# Patient Record
Sex: FEMALE | Race: WHITE
[De-identification: names, ages, dates, MRNs, and addresses within clinical notes are randomized per-mention and may not be internally consistent; named-entity substitution may affect disease eponyms.]

---

## 2019-09-30 ENCOUNTER — HOSPITAL ENCOUNTER (OUTPATIENT)
Dept: HOSPITAL 53 - M LAB | Age: 39
End: 2019-09-30
Payer: COMMERCIAL

## 2019-09-30 DIAGNOSIS — Z32.01: Primary | ICD-10-CM

## 2019-09-30 LAB
B-HCG SERPL-ACNC: 989 MIU/ML
ESTRADIOL SERPL-MCNC: 365.7 PG/ML
PROGEST SERPL-MCNC: 53.19 NG/ML
TSH SERPL DL<=0.005 MIU/L-ACNC: 1.11 UIU/ML (ref 0.36–3.74)

## 2020-04-09 ENCOUNTER — HOSPITAL ENCOUNTER (OUTPATIENT)
Dept: HOSPITAL 53 - M RAD | Age: 40
End: 2020-04-09
Attending: OBSTETRICS & GYNECOLOGY
Payer: COMMERCIAL

## 2020-04-09 DIAGNOSIS — O30.043: Primary | ICD-10-CM

## 2020-04-09 DIAGNOSIS — Z3A.31: ICD-10-CM

## 2020-04-09 NOTE — REP
OB ULTRASOUND, TWIN GESTATION:

 

Real-time sonographic evaluation of the gravid uterus is performed.

 

There is a living diamniotic dichorionic twin gestation. Estimated gestational

age is 32 weeks 0 days, EDC 06/04/2020. Placentas are on the right laterally as

well as posteriorly and are grade 1 with no previa or abruption. Cervix is not

well visualized due to low fetal head position for fetus A. There is concordant

growth of the twins.

 

FETUS A:

 

BPD 84 mm = 33 weeks 5 days, 75th percentile

 

 mm = 32 weeks 1 day, 53rd percentile

 

 mm = 30 weeks 5 days, 30th percentile

 

FL 62 mm = 31 weeks 6 days, 49th percentile

 

HC/AC ratio 1.10. Estimated fetal weight 1767 grams, 32nd percentile. Fetal heart

rate 128 beats per minute. Amniotic fluid within normal limits, deepest pocket of

fluid is 4.2 cm. S/D ratio 2.55 and RI 0.61. Visualized fetal anatomy today

includes upper lip, four chamber heart, ventricular outflow tracts, stomach,

kidneys, bladder and spine which are all grossly unremarkable. Fetal position

vertex on the maternal right side.

 

FETUS B:

 

BPD 83 mm = 33 weeks 2 days, 68th percentile

 

 mm = 32 weeks 2 days, 54th percentile

 

 mm = 32 weeks 0 days, 50th percentile

 

FL 60 mm = 31 weeks 3 days, 41st percentile

 

HC/AC ratio 1.05. Estimated fetal weight 1868 grams, 42nd percentile. Fetal heart

rate 127 beats per minute. Amniotic fluid within normal limits, deepest pocket of

fluid 3.9 cm. S/D ratio in the umbilical artery 2.47 and RI 0.59. Visualized

fetal anatomy today includes upper lip, four chamber heart, ventricular outflow

tracts, stomach, kidneys and bladder which are grossly unremarkable. Fetal

position is vertex on the maternal left side.

 

 

Electronically Signed by

Jm Patel MD 04/10/2020 09:30 A

## 2020-04-28 ENCOUNTER — HOSPITAL ENCOUNTER (OUTPATIENT)
Dept: HOSPITAL 53 - M RAD | Age: 40
End: 2020-04-28
Attending: OBSTETRICS & GYNECOLOGY
Payer: COMMERCIAL

## 2020-04-28 DIAGNOSIS — O30.043: Primary | ICD-10-CM

## 2020-04-28 DIAGNOSIS — Z3A.34: ICD-10-CM

## 2020-04-28 NOTE — REP
Clinical:  Twin gestation.

 

Comparison: 04/09/2020 .

 

Findings:

Examination demonstrates advanced diamniotic dichorionic twin gestation. Cervix

appears closed.  Concordant growth is noted.

 

Gestational age by LMP at 34 weeks 5 days with estimated date of delivery

06/04/2020 .

 

 

TWIN A:

Twin A identified in cephalic presentation along the maternal right side.

Placenta is noted right lateral and grade I I without evidence for placenta

previa or abruption.  Fetal motion is appreciated.  Amniotic fluid volume is

normal and the deepest pocket measures 4.7 cm.

 

FHR equals 128 beats per minute.

Gestational age by current biometrical measurements: 34 weeks 6 days .

Umbilical cord SD ratio (fetal insertion):  2.31

 

Estimated fetal weight 2322 grams ( 35th percentile).

 

Limited anatomical assessment without obvious abnormality.

 

-------

 

TWIN B:

Twin B identified in cephalic presentation along the maternal left side.

Placenta is noted right lateral and grade I I without evidence for placenta

previa or abruption.  Fetal motion is appreciated.  Amniotic fluid volume is

normal and the deepest pocket measures 2.7 cm.

 

FHR equals 131 beats per minute.

Gestational age by current biometrical measurements: 34 weeks 1 day .

Umbilical cord SD ratio (fetal insertion):  2.65

 

Estimated fetal weight 2478 grams ( 46 percentile).

 

Limited anatomical assessment without obvious abnormality.

 

 

 

Impression:

Advanced diamniotic dichorionic twin gestation demonstrating appropriate

concordant growth.  No obvious gross abnormalities are identified.

 

 

Electronically Signed by

Colin Ordoñez MD 04/28/2020 05:49 P

## 2020-05-05 ENCOUNTER — HOSPITAL ENCOUNTER (OUTPATIENT)
Dept: HOSPITAL 53 - M RAD | Age: 40
End: 2020-05-05
Attending: GENERAL PRACTICE
Payer: COMMERCIAL

## 2020-05-05 DIAGNOSIS — Z3A.35: ICD-10-CM

## 2020-05-05 DIAGNOSIS — O30.043: Primary | ICD-10-CM

## 2020-05-06 NOTE — REP
Clinical:  Twin gestation. Fetal well-being.

 

Comparison: 04/28/2020 .

 

Findings:

Examination demonstrates advanced diamniotic dichorionic twin gestation.

 

Gestational age by LMP at 35 weeks 5 days with estimated date of delivery

06/04/2020 .

 

 

TWIN A:

Twin A identified in cephalic presentation along the maternal lower midline side.

Placenta is noted right lateral and grade I I without evidence for placenta

previa or abruption.  Fetal motion is appreciated.  Amniotic fluid volume is

normal and the deepest pocket measures 4.9 cm.

 

FHR equals 124 beats per minute.

Biophysical profile: 8/8

Umbilical cord Doppler SD ratio:  3.18

 

-------

 

TWIN B:

Twin B identified in cephalic presentation along the maternal left side.

Placenta is noted posterior and grade I I without evidence for placenta previa or

abruption.  Fetal motion is appreciated.  Amniotic fluid volume is normal and the

deepest pocket measures 3.7 cm.

 

FHR equals 133 beats per minute.

Biophysical profile: 8/8

Umbilical cord Doppler SD ratio:  3.65

 

 

 

Impression:

Advanced diamniotic dichorionic twin gestation demonstrating normal biophysical

profile score.

 

 

Electronically Signed by

Colin Ordoñez MD 05/06/2020 04:08 A

## 2020-05-12 ENCOUNTER — HOSPITAL ENCOUNTER (OUTPATIENT)
Dept: HOSPITAL 53 - M RAD | Age: 40
End: 2020-05-12
Attending: GENERAL PRACTICE
Payer: COMMERCIAL

## 2020-05-12 DIAGNOSIS — O30.049: Primary | ICD-10-CM

## 2020-05-13 NOTE — REP
Clinical:  Twin gestation. Growth evaluation

 

Comparison: 05/05/2020 .

 

Findings:

Examination demonstrates advanced diamniotic dichorionic twin gestation. Cervix

appears closed.  Concordant growth is noted.

 

Gestational age by LMP at 36 weeks 5 days with estimated date of delivery

06/04/2020 .

 

 

TWIN A:

Twin A identified in cephalic presentation along the maternal right presenting.

Fetal motion is appreciated.  Amniotic fluid volume is normal and the deepest

pocket measures 4.2 cm.

 

FHR equals 123 beats per minute.

Biophysical profile score: 8/8

 

Gestational age by current measurements: 35 weeks 5 days .

 

Estimated fetal weight 2601 grams ( 27th percentile).

 

Limited anatomical assessment without obvious abnormality.

 

-------

 

TWIN B:

Twin B identified in cephalic presentation along the maternal left side.  Fetal

motion is appreciated.  Amniotic fluid volume is normal and the deepest pocket

measures 4.2 cm.

 

FHR equals 137 beats per minute.

Biophysical profile score: 8/8

 

Gestational age by current measurements: 36 weeks 3 days .

 

Estimated fetal weight 2935 grams ( 48th percentile).

 

Limited anatomical assessment without obvious abnormality.

 

 

 

Impression:

Advanced diamniotic dichorionic twin gestation demonstrating appropriate

concordant growth.  No gross abnormalities are identified.

 

 

Electronically Signed by

Colin Ordoñez MD 05/13/2020 02:22 A

## 2020-05-14 ENCOUNTER — HOSPITAL ENCOUNTER (INPATIENT)
Dept: HOSPITAL 53 - M LDO | Age: 40
LOS: 2 days | Discharge: HOME | End: 2020-05-16
Attending: OBSTETRICS & GYNECOLOGY | Admitting: OBSTETRICS & GYNECOLOGY
Payer: COMMERCIAL

## 2020-05-14 VITALS — HEIGHT: 72 IN | BODY MASS INDEX: 30.7 KG/M2 | WEIGHT: 226.64 LBS

## 2020-05-14 VITALS — DIASTOLIC BLOOD PRESSURE: 82 MMHG | SYSTOLIC BLOOD PRESSURE: 117 MMHG

## 2020-05-14 VITALS — SYSTOLIC BLOOD PRESSURE: 126 MMHG | DIASTOLIC BLOOD PRESSURE: 81 MMHG

## 2020-05-14 DIAGNOSIS — Z3A.37: ICD-10-CM

## 2020-05-14 DIAGNOSIS — O09.523: ICD-10-CM

## 2020-05-14 DIAGNOSIS — O30.049: Primary | ICD-10-CM

## 2020-05-14 LAB
HCT VFR BLD AUTO: 32 % (ref 36–47)
HGB BLD-MCNC: 10.4 G/DL (ref 12–15.5)
MCH RBC QN AUTO: 27.9 PG (ref 27–33)
MCHC RBC AUTO-ENTMCNC: 32.5 G/DL (ref 32–36.5)
MCV RBC AUTO: 85.8 FL (ref 80–96)
PLATELET # BLD AUTO: 177 10^3/UL (ref 150–450)
RBC # BLD AUTO: 3.73 10^6/UL (ref 4–5.4)
WBC # BLD AUTO: 11.5 10^3/UL (ref 4–10)

## 2020-05-14 NOTE — HPEPDOC
Obstetrical History & Physical


General


Date of Admission


May 14, 2020





History of Present Illness


Mary is a 38yo  with di-di twin IUP at 37w0d by IUI dating presenting 

for CC of hearing a "pop" and feeling gush of fluid. Went to the bathroom and 

noted fluid was blood-tinged. Started having ctx in the car on the way here. 

Feels good fetal movement. No f/c/n/v/CP/SOB.


Chief Complaint:  Contractions, term, LOF, term


Information Provided By:  Patient





Prenatal Care


Prenatal Care:  Good Care





Prenatal Dating


Final EDC:  2020


Final EDC by:  LMP, 1st trimester (US)





Antepartum Course


Prenatal Diagnos(e)s


Di-di twin gestation by femara/IUI, A1GDM well controlled, AMA (normal 

[id number] testing)


Height (inches):  72


Pre-Pregnancy weight (lbs.):  175


Admission Weight (lbs.):  226


Change in Weight (lbs.):  51





Past Medical History


Past Obstetrical History :  


   Past Obstetrical History:  Multigravida ( early sab no interventions, 

02 uncomplicated  at 38wk 4sd28bc M, 06 uncomplicated  at 37wk 

8lb2oz M)


GYN History:  No pertinent history


Past Medical History


Medical History


benign


Surgical History:  Denies/None





Family History


Significant Family History:  No pertinent family hx





Social History


Marital Status:  


Family situation:  Spouse/partner home


Psychosocial History:  No pertinent psych hx


* Smoker:  non-smoker


Alcohol:  Denies


Drugs:  denies





Prenatal Imunizations


Tdap status:  current


Influenza Status:  current





Physical Examination


Physical Examination


GENERAL: Alert and oriented times three.


ABDOMEN: Gravid and non-tender to touch.


FETUS: twin A is vertex (VTX) by sterile vaginal examination (SVE) and TAUS, 

twin B is vertex by TAUS 


EXTREMITIES: No edema of BLE





Pertinent Laboratoy Data


Blood Type:  A+


RBC Antibody Screen:  Negative


HIV:  Negative


Hepatitis B:  Negative


Hepatitis C:  Unknown


Rapid Plasma Reagin:  Nonreactive


Rubella:  Immune


Varicella:  Immune


Chlamydia/Gonorrhea:  Negative


Group B Streptococcus:  Positive (urine)


Quad Screen Test:  Negative ([id number])


Glucose Tolerance Test:  167 (3hr GTT 73/183/165/117)





Anatomy Ultrasound


Ultrasound Date:  2020


Placenta Location:  Twin A (A: placenta posterior B: placenta posterofundal)


Normal Anatomy:  Yes (for both fetuses)


Placenta Previa:  No





Other Ultrasounds


2020 growth scan at [facility]: twin A 27%ile and twin B 48%ile, both had BPP 8/8 

and cephalic/cephalic





 Steroid Therapy


 Steroid Therapy:  No





Vaginal Examination


Dilation:  3 cm


Effacement:  90%


Station:  -2


Cervical Consistency:  Soft


Cervical Position:  Anterior


Fetal Presentation:  Cephalic presentation (x2 by SCE and TAUS)





Fetal Assessment


Fetal Heart Rate (FHR):  120 (twin B 130's)


Variability:  Moderate (x2)


Accelerations:  Positive (x2)


Decelerations:  None





Tocometer


Contractions:  Yes


Frequency:  regular, every 2-2 min.


Duration:  greater than 60 seconds


Strength:  palpated as strong





Assessment/Plan


Assessment


Mary is a 38yo  with di-di twin IUP at 37w0d by IUI dating with 

SROM/early labor having SCE 3/90/-2, grossly ruptured with clear/blood-tinged 

fluid. Ctx q2min. Cat I FHRT x2. Vitals wnl, benign exam. GBS positive by early 

urine. Cephalic/cephalic.





Prenatal course/PMhx significant for: Di-di twin gestation by femara/IUI, A1GDM 

well controlled, AMA (normal [id number] testing)





Plan


Admit and orient.


Counseled and consented for . Previously discussed and re-addressed on 

admission the possibility of  for twin A with subsequent need for  

for twin B, patient understands the risk. Plan for delivery in the OR when 

patient is ready to push.


No augmentation at this time since patient is leslie regularly after having

SROM 


Diet: NPO


Group B Streptococcus (GBS) positive: PCN per protocol


Labs and intravenous (IV) per unit protocol.


Lactated Ringers (LR): Bolus 500 mL, then at 125 mL/hr.


Anticipate normal spontaneous delivery ()


Candidate for epidural


Safe to proceed





MD Ale Hamm Katrina D MD           May 14, 2020 23:26

## 2020-05-15 VITALS — DIASTOLIC BLOOD PRESSURE: 84 MMHG | SYSTOLIC BLOOD PRESSURE: 143 MMHG

## 2020-05-15 VITALS — DIASTOLIC BLOOD PRESSURE: 78 MMHG | SYSTOLIC BLOOD PRESSURE: 132 MMHG

## 2020-05-15 VITALS — SYSTOLIC BLOOD PRESSURE: 124 MMHG | DIASTOLIC BLOOD PRESSURE: 70 MMHG

## 2020-05-15 VITALS — DIASTOLIC BLOOD PRESSURE: 73 MMHG | SYSTOLIC BLOOD PRESSURE: 125 MMHG

## 2020-05-15 VITALS — DIASTOLIC BLOOD PRESSURE: 63 MMHG | SYSTOLIC BLOOD PRESSURE: 147 MMHG

## 2020-05-15 VITALS — DIASTOLIC BLOOD PRESSURE: 60 MMHG | SYSTOLIC BLOOD PRESSURE: 114 MMHG

## 2020-05-15 VITALS — SYSTOLIC BLOOD PRESSURE: 164 MMHG | DIASTOLIC BLOOD PRESSURE: 73 MMHG

## 2020-05-15 VITALS — SYSTOLIC BLOOD PRESSURE: 110 MMHG | DIASTOLIC BLOOD PRESSURE: 79 MMHG

## 2020-05-15 VITALS — SYSTOLIC BLOOD PRESSURE: 113 MMHG | DIASTOLIC BLOOD PRESSURE: 64 MMHG

## 2020-05-15 VITALS — SYSTOLIC BLOOD PRESSURE: 171 MMHG | DIASTOLIC BLOOD PRESSURE: 124 MMHG

## 2020-05-15 VITALS — SYSTOLIC BLOOD PRESSURE: 152 MMHG | DIASTOLIC BLOOD PRESSURE: 77 MMHG

## 2020-05-15 VITALS — SYSTOLIC BLOOD PRESSURE: 131 MMHG | DIASTOLIC BLOOD PRESSURE: 62 MMHG

## 2020-05-15 VITALS — DIASTOLIC BLOOD PRESSURE: 62 MMHG | SYSTOLIC BLOOD PRESSURE: 136 MMHG

## 2020-05-15 VITALS — DIASTOLIC BLOOD PRESSURE: 70 MMHG | SYSTOLIC BLOOD PRESSURE: 124 MMHG

## 2020-05-15 VITALS — SYSTOLIC BLOOD PRESSURE: 128 MMHG | DIASTOLIC BLOOD PRESSURE: 63 MMHG

## 2020-05-15 VITALS — DIASTOLIC BLOOD PRESSURE: 59 MMHG | SYSTOLIC BLOOD PRESSURE: 136 MMHG

## 2020-05-15 VITALS — SYSTOLIC BLOOD PRESSURE: 126 MMHG | DIASTOLIC BLOOD PRESSURE: 58 MMHG

## 2020-05-15 VITALS — DIASTOLIC BLOOD PRESSURE: 67 MMHG | SYSTOLIC BLOOD PRESSURE: 113 MMHG

## 2020-05-15 VITALS — SYSTOLIC BLOOD PRESSURE: 121 MMHG | DIASTOLIC BLOOD PRESSURE: 69 MMHG

## 2020-05-15 VITALS — SYSTOLIC BLOOD PRESSURE: 118 MMHG | DIASTOLIC BLOOD PRESSURE: 62 MMHG

## 2020-05-15 VITALS — DIASTOLIC BLOOD PRESSURE: 64 MMHG | SYSTOLIC BLOOD PRESSURE: 127 MMHG

## 2020-05-15 VITALS — DIASTOLIC BLOOD PRESSURE: 67 MMHG | SYSTOLIC BLOOD PRESSURE: 125 MMHG

## 2020-05-15 VITALS — DIASTOLIC BLOOD PRESSURE: 63 MMHG | SYSTOLIC BLOOD PRESSURE: 144 MMHG

## 2020-05-15 VITALS — SYSTOLIC BLOOD PRESSURE: 121 MMHG | DIASTOLIC BLOOD PRESSURE: 63 MMHG

## 2020-05-15 RX ADMIN — IBUPROFEN PRN MG: 600 TABLET, FILM COATED ORAL at 04:22

## 2020-05-15 RX ADMIN — IBUPROFEN PRN MG: 600 TABLET, FILM COATED ORAL at 12:52

## 2020-05-15 RX ADMIN — ACETAMINOPHEN PRN MG: 500 TABLET ORAL at 08:37

## 2020-05-15 RX ADMIN — Medication SCH TAB: at 08:33

## 2020-05-15 NOTE — DNPDOC
[facility] Delivery Note


Delivery Note


DATE OF DELIVERY: 15 May 2020





PREDELIVERY DIAGNOSIS: 


di-di twin gestation at 37w0d


active labor  





POST DELIVERY DIAGNOSIS: Delivered.





PROCEDURE: Spontaneous vaginal delivery





OBSTETRICIAN: Dr. Rosa Maria Aguilera MD





ANESTHESIA: epidural





ESTIMATED BLOOD LOSS: 400 mL.





FINDINGS: 


Twin A:


5 pound 7 ounce (2480g) female infant, Apgar Score 9/9


Twin B:


6 pound 5 ounce (2850g) male infant, Apgar Score 9/9





DELIVERY SUMMARY: 


Mary is a 40yo [id number] s/p uncomplicated  x2 at 0252 and 0255 on 

5/15/20 when she presented to L&D in active labor with SROM at 37w0d. She was 

3/90/-2 on admission, she received an epidural and progressed to C/C/0 at which 

point she began pushing. Within just a couple sets of pushes, twin A's  head 

delivered OA, restituted CLAUDIA. Left anterior shoulder delivered with ease 

followed by right posterior shoulder, then remainder of body delivered to 

maternal abdomen where infant was dried and stimulated; nose and mouth suctioned

with bulb suction, strong, lusty cry, apgars 9/9. At 2 minutes of life, cord 

clamped x2 and cut by FOB. 





At that time I confirmed that twin B was presenting cephalic still, and allowed 

the uterus to contract and bring twin B lower. When twin B was at 0 station, 

AROM was performed with scant clear fluid noted. Within just a couple sets of 

pushes, twin B's  head delivered OA, restituted LEANDRO. Right anterior shoulder 

delivered with ease followed by posterior shoulder, then remainder of body 

delivered to maternal abdomen where infant was dried and stimulated; nose and 

mouth suctioned with bulb suction, strong, lusty cry, apgars 9/9. At 2 minutes 

of life, cord clamped x2 and cut by FOB. 





Upon inspection of vagina and perineum, a right labial laceration and small left

labial abrasion were noted and repaired with 3-0 vicryl suture in usual fashion.

Good cosmetic effect with total reapproximation and complete hemostasis noted. 

With uterine massage and traction on the cord, placentas (fused) delivered 

spontaneously and intact with 3 vessel centrally inserted cords. Pitocin bolus 

started with delivery of placentas. Fundus then firm at u-2cm with hemostasis 

noted.  All counts correct x2. 800mcg cytotec placed rectally for prophylaxis 

against future bleeding. Mom and infants were doing well when I left the room.





MD Ale Hamm Katrina D MD           May 15, 2020 07:24

## 2020-05-15 NOTE — IPN
DATE:  05/15/2020

 

This patient and  requested circumcision of their  male infant,

twin B.  After discussing risks and benefits of circumcision, the medical and

nonmedical indications, the penile block and aftercare and bleeding, they

expressed understanding of penile block, aftercare and bleeding.  Signed the

consent form.  All questions were answered.  20-minute discussion.  We await

clearance by the pediatrician.

## 2020-05-15 NOTE — IPNPDOC
Text Note


Date of Service


The patient was seen on 5/15/20.





NOTE


Intrapartum Note





Went to room for FHR decel of twin A to 60's.





Pt recently received epidural and is comfortable. Re-positioned her to her side,

performed AROM of a forebag (SCE now 8/90/-2) with clear/bloody fluid noted and 

FSE placed. Fetal heart rate slowly improved back to baseline (nadired to 60's 

and recovered all over 5 minutes). Patient on her side with peanut ball in place

now. 





Twin B has Cat I FHRT. 





Will continue to closely monitor.





Safe to proceed.





Dr. Rosa Maria Aguilera MD





VS,Caprice, I+O


VS, [facility]rice, I+O


Laboratory Tests


5/14/20 23:12

















Rosa Maria Aguilera MD           May 15, 2020 00:52

## 2020-05-16 VITALS — SYSTOLIC BLOOD PRESSURE: 121 MMHG | DIASTOLIC BLOOD PRESSURE: 74 MMHG

## 2020-05-16 RX ADMIN — IBUPROFEN PRN MG: 600 TABLET, FILM COATED ORAL at 12:10

## 2020-05-16 RX ADMIN — IBUPROFEN PRN MG: 600 TABLET, FILM COATED ORAL at 06:03

## 2020-05-16 RX ADMIN — ACETAMINOPHEN PRN MG: 500 TABLET ORAL at 14:53

## 2020-05-16 RX ADMIN — Medication SCH TAB: at 08:46

## 2020-05-17 NOTE — DSES
DATE OF ADMISSION:  2020

DATE OF DISCHARGE:  2020

 

This lady is a 39-year-old  4 now, para 4, admitted in labor at 37 weeks

of gestation with contractions and loss of fluid, having dichorionic-diamniotic

(di-di) twins.  She had an epidural in place and delivered twin A, vertex female,

Apgar scores of 9 and 9 at one and five minutes, respectfully, 2480 grams, 5

pounds 7 ounces.  Then she delivered twin B, male, vaginally, Apgar scores  of 9

and 9, 6 pounds 5 ounces, 2850 grams.  Her risk factors are there was an

intrauterine insemination (IUI).  She has di-di twins, advanced maternal age

(AMA), and aGDM 1, and she was admitted in labor.  We discussed phlebitis,

cystitis, mastitis, metritis, and cellulitis, diet, exercise, pain management,

perineal, breast, and wound care.

 

Her vital signs today on discharge:  Her blood pressure 121/74, respirations are

17, pulse 65, temperature is 97.9.

 

Her lab values:  Hemoglobin on admission was 10.4, hematocrit 32.0, platelets

were 177.

 

On discharge, we discussed phlebitis, cystitis, mastitis, metritis, cellulitis,

diet, exercise, pain management, perineal and breast care.  Rest of the

examination was unremarkable.  Normocephalic, atraumatic.  Neck:  Full range of

motion.  Pupils equal and reactive to light.  Distal pulses symmetric.  No

evidence of deep vein thrombosis (DVT), pulmonary embolism (PE), or superficial

phlebitis.  Chest is clear bilaterally to bases.  No wheezes or rhonchi.  No

costovertebral angle (CVA) tenderness.  No evidence of nausea, vomiting,

diarrhea, or constipation.  No urgency or frequency.  We discussed getting her

medications at [place], having a 6-week postpartum checkup at [facility],

either virtual appointment or actual appointment, at which time discussion will

take place regarding ongoing postpartum care.  The patient was in aGDM 1, diet

controlled.  May have a 6-week glucose test to review the risk of permanent

diabetes.  The patient expressed understanding of same and was discharged

improved.

## 2020-05-22 ENCOUNTER — HOSPITAL ENCOUNTER (EMERGENCY)
Dept: HOSPITAL 53 - M ED | Age: 40
Discharge: HOME | End: 2020-05-22
Payer: COMMERCIAL

## 2020-05-22 VITALS — BODY MASS INDEX: 26.28 KG/M2 | HEIGHT: 72 IN | WEIGHT: 194.01 LBS

## 2020-05-22 VITALS — DIASTOLIC BLOOD PRESSURE: 71 MMHG | SYSTOLIC BLOOD PRESSURE: 150 MMHG

## 2020-05-22 DIAGNOSIS — N39.0: ICD-10-CM

## 2020-05-22 LAB
ALBUMIN SERPL BCG-MCNC: 2.7 GM/DL (ref 3.2–5.2)
ALT SERPL W P-5'-P-CCNC: 40 U/L (ref 12–78)
BASOPHILS # BLD AUTO: 0 10^3/UL (ref 0–0.2)
BASOPHILS NFR BLD AUTO: 0.5 % (ref 0–1)
BILIRUB CONJ SERPL-MCNC: 0.2 MG/DL (ref 0–0.2)
BILIRUB SERPL-MCNC: 0.8 MG/DL (ref 0.2–1)
EOSINOPHIL # BLD AUTO: 0.2 10^3/UL (ref 0–0.5)
EOSINOPHIL NFR BLD AUTO: 2 % (ref 0–3)
HCT VFR BLD AUTO: 30.1 % (ref 36–47)
HGB BLD-MCNC: 9.8 G/DL (ref 12–15.5)
LIPASE SERPL-CCNC: 112 U/L (ref 73–393)
LYMPHOCYTES # BLD AUTO: 1.8 10^3/UL (ref 1.5–5)
LYMPHOCYTES NFR BLD AUTO: 21.3 % (ref 24–44)
MCH RBC QN AUTO: 28.6 PG (ref 27–33)
MCHC RBC AUTO-ENTMCNC: 32.6 G/DL (ref 32–36.5)
MCV RBC AUTO: 87.8 FL (ref 80–96)
MONOCYTES # BLD AUTO: 0.6 10^3/UL (ref 0–0.8)
MONOCYTES NFR BLD AUTO: 7.2 % (ref 0–5)
NEUTROPHILS # BLD AUTO: 5.9 10^3/UL (ref 1.5–8.5)
NEUTROPHILS NFR BLD AUTO: 68.4 % (ref 36–66)
PLATELET # BLD AUTO: 297 10^3/UL (ref 150–450)
PROT SERPL-MCNC: 6 GM/DL (ref 6.4–8.2)
RBC # BLD AUTO: 3.43 10^6/UL (ref 4–5.4)
WBC # BLD AUTO: 8.6 10^3/UL (ref 4–10)

## 2020-05-22 PROCEDURE — 85025 COMPLETE CBC W/AUTO DIFF WBC: CPT

## 2020-05-22 PROCEDURE — 96361 HYDRATE IV INFUSION ADD-ON: CPT

## 2020-05-22 PROCEDURE — 87086 URINE CULTURE/COLONY COUNT: CPT

## 2020-05-22 PROCEDURE — 99284 EMERGENCY DEPT VISIT MOD MDM: CPT

## 2020-05-22 PROCEDURE — 93976 VASCULAR STUDY: CPT

## 2020-05-22 PROCEDURE — 80047 BASIC METABLC PNL IONIZED CA: CPT

## 2020-05-22 PROCEDURE — 83605 ASSAY OF LACTIC ACID: CPT

## 2020-05-22 PROCEDURE — 81001 URINALYSIS AUTO W/SCOPE: CPT

## 2020-05-22 PROCEDURE — 76856 US EXAM PELVIC COMPLETE: CPT

## 2020-05-22 PROCEDURE — 83690 ASSAY OF LIPASE: CPT

## 2020-05-22 PROCEDURE — 80076 HEPATIC FUNCTION PANEL: CPT

## 2020-05-22 PROCEDURE — 93041 RHYTHM ECG TRACING: CPT

## 2020-05-22 PROCEDURE — 96374 THER/PROPH/DIAG INJ IV PUSH: CPT

## 2020-05-22 NOTE — REP
PELVIC ULTRASOUND:

 

Real-time sonographic evaluation of pelvis performed utilizing transabdominal

technique.

 

Bladder is empty. The uterus measures 17.4 x 7.8 x 12.2 cm, enlarged. Endometrium

is heterogeneous and thickened measuring 36 mm in AP dimension. The endometrial

echocomplex is not hyperemic.  Echotexture is heterogeneous and there is

endometrial fluid present. Ovaries are normal in size and echotexture, right

ovary measuring 3.0 x 2.4 x 2.7 cm, and left ovary 3.4 x 2.9 x 2.9 cm. There is

no adnexal mass or free fluid. There is no evidence of ovarian torsion with

duplex Doppler evaluation.

 

IMPRESSION:

 

Thickened endometrium at 36 mm along with endometrial fluid. Findings may

indicate some degree of retained products of conception.

 

 

Electronically Signed by

Jm Patel MD 05/22/2020 03:54 P